# Patient Record
Sex: FEMALE | Race: ASIAN | NOT HISPANIC OR LATINO | Employment: UNEMPLOYED | URBAN - METROPOLITAN AREA
[De-identification: names, ages, dates, MRNs, and addresses within clinical notes are randomized per-mention and may not be internally consistent; named-entity substitution may affect disease eponyms.]

---

## 2019-04-15 ENCOUNTER — MEDICAL CORRESPONDENCE (OUTPATIENT)
Dept: HEALTH INFORMATION MANAGEMENT | Facility: CLINIC | Age: 54
End: 2019-04-15

## 2019-04-15 ENCOUNTER — TRANSFERRED RECORDS (OUTPATIENT)
Dept: HEALTH INFORMATION MANAGEMENT | Facility: CLINIC | Age: 54
End: 2019-04-15

## 2019-04-19 ENCOUNTER — TELEPHONE (OUTPATIENT)
Dept: MAMMOGRAPHY | Facility: CLINIC | Age: 54
End: 2019-04-19
Payer: COMMERCIAL

## 2020-04-05 ENCOUNTER — OFFICE VISIT (OUTPATIENT)
Dept: URGENT CARE | Facility: URGENT CARE | Age: 55
End: 2020-04-05
Payer: COMMERCIAL

## 2020-04-05 VITALS
TEMPERATURE: 98.4 F | RESPIRATION RATE: 16 BRPM | OXYGEN SATURATION: 98 % | DIASTOLIC BLOOD PRESSURE: 89 MMHG | SYSTOLIC BLOOD PRESSURE: 151 MMHG | WEIGHT: 145.5 LBS | HEART RATE: 71 BPM

## 2020-04-05 DIAGNOSIS — R10.13 EPIGASTRIC PAIN: Primary | ICD-10-CM

## 2020-04-05 PROCEDURE — 99203 OFFICE O/P NEW LOW 30 MIN: CPT | Performed by: NURSE PRACTITIONER

## 2020-04-05 PROCEDURE — 93000 ELECTROCARDIOGRAM COMPLETE: CPT | Performed by: NURSE PRACTITIONER

## 2020-04-05 RX ORDER — IBUPROFEN 600 MG/1
TABLET, FILM COATED ORAL
COMMUNITY
Start: 2020-03-22 | End: 2020-09-07

## 2020-04-05 RX ORDER — CYCLOBENZAPRINE HCL 10 MG
TABLET ORAL
COMMUNITY
Start: 2020-03-07 | End: 2020-09-07

## 2020-04-05 RX ORDER — SERTRALINE HYDROCHLORIDE 100 MG/1
100 TABLET, FILM COATED ORAL DAILY
COMMUNITY
Start: 2020-03-07

## 2020-04-05 ASSESSMENT — ENCOUNTER SYMPTOMS
SHORTNESS OF BREATH: 0
HEADACHES: 0
RHINORRHEA: 0
SORE THROAT: 0
COUGH: 0
FEVER: 0
DIARRHEA: 0
VOMITING: 0
ABDOMINAL PAIN: 1
CHILLS: 0
NAUSEA: 0

## 2020-04-05 NOTE — PATIENT INSTRUCTIONS
Patient Education     Epigastric Pain (Uncertain Cause)  Epigastric pain is pain in the upper abdomen. It can be a sign of disease. Common causes include:    Acid reflux (stomach acid flowing up into the esophagus)    Gastritis (irritation of the stomach lining) Most often this is from aspirin or NSAID medicines such as ibuprofen, bacteria called H. pylori, or frequent alcohol use.    Peptic ulcer disease    Inflammation of the pancreas    Gallstone    Infection in the gallbladder  Pain may be dull or burning. It may spread upward to the chest or to the back. There may be other symptoms such as belching, bloating, cramps or hunger pains. There may be weight loss or poor appetite, nausea or vomiting.  Since the cause of your pain is not certain yet,you may need more tests. Sometimes the doctor will treat you for the most likely condition to see if there is improvement before doing more tests.  Home care  Medicines    Antacids help neutralize the normal acids in your stomach.If you don t like the liquid, you can try a chewable one. You may find one works better than another for you. Overuse can cause diarrhea or constipation.    Acid blockers (H2 blockers) decrease acid production. Examples are cimetidine, famotidine, and ranitidine.    Acid inhibitors (PPIs) decrease acid production in a different way than the blockers. You may find they work better, but can take a little longer to take effect.  Examples are omeprazole, lansoprazole, pantoprazole, rabeprazole, and esomeprazole. Many of these are available over-the-counter or available as generics.    Take an antacid 30 to 60 minutes after eating and at bedtime, but not at the same time as an acid blocker.    Try not to take NSAIDs such as ibuprofen. Aspirin may also cause problems, but if taking it for your heart or other medical reasons, talk to your doctor before stopping it; you don't want to cause a worse problem, like a heart attack or stroke.  Diet    If  certain foods seem to cause your pain, try not to eat them. Certain foods can worsen symptoms of gastritis. Limit or avoid fatty, fried, and spicy foods, as well as coffee, chocolate, mint, and foods with high acid content such as tomatoes and citrus fruit and juices (orange, grapefruit, lemon).    Eat slowly and chew food well before swallowing. Symptoms of gastritis can be worsened by certain foods.    Don't drink alcohol. It can irritate the stomach.    Don't consume caffeine, or use tobacco. These can delay healing and worsen your problem.    Try eating smaller meals with snacks in between.    Keep an empty stomach for 2 to 3 hours before lying down.    Prop the head of the bed up if you have overnight symptoms. This helps acid clear from your esophagus.    Follow-up care  Follow up with your healthcare provider or as advised.  When to seek medical advice  Call your healthcare provider right away if any of the following occur:    Stomach pain worsens or moves to the right lower part of the abdomen    Chest pain appears, or if it worsens or spreads to the chest, back, neck, shoulder, or arm    Frequent vomiting (can t keep down liquids)    Blood in the stool or vomit (red or black color)    Feeling weak or dizzy, fainting, or having trouble breathing    Fever of 100.4 F (38 C) or higher, or as directed by your healthcare provider    Abdominal swelling  Date Last Reviewed: 3/1/2018    9744-3824 [employer]. [address] [place] [address], [place] [address]. All rights reserved. This information is not intended as a substitute for professional medical care. Always follow your healthcare professional's instructions.

## 2020-04-05 NOTE — PROGRESS NOTES
SUBJECTIVE:   Tracie Sosa is a 54 year old female presenting with a chief complaint of   Chief Complaint   Patient presents with     Abdominal Pain     1 weks ago felt like something was stuck in epigastreal area now it is worse       She is an established patient of [facility].    Upper abdomin Pain    Location: epigastric   Radiation: None.    Pain character: aching and burning,   Severity: 7 on a scale of 1-10.    Duration: 1 week(s)   Course of Illness: slowly progressive.  Exacerbated by: nothing  Relieved by: nothing.  Associated Symptoms: nausea.  Female : Not applicable  Surgical History: none  Daughter is requesting an EKG      Review of Systems   Constitutional: Negative for chills and fever.   HENT: Negative for congestion, ear pain, rhinorrhea and sore throat.    Respiratory: Negative for cough and shortness of breath.    Gastrointestinal: Positive for abdominal pain. Negative for diarrhea, nausea and vomiting.   Neurological: Negative for headaches.   All other systems reviewed and are negative.      No past medical history on file.  Family History   Problem Relation Age of Onset     Diabetes No family hx of      Cancer No family hx of      Hypertension No family hx of      Cerebrovascular Disease No family hx of      Glaucoma No family hx of      Thyroid Disease No family hx of      Macular Degeneration No family hx of      Current Outpatient Medications   Medication Sig Dispense Refill     cyclobenzaprine (FLEXERIL) 10 MG tablet TAKE 1 TABLET BY MOUTH EVERY DAY AS NEEDED       ibuprofen (ADVIL/MOTRIN) 600 MG tablet TAKE 1 TAB BY MOUTH EVERY 4 TO 6 (FOUR TO SIX) HOURS AS NEEDED FOR PAIN       ranitidine (ZANTAC) 300 MG tablet TAKE 1 TABLET BY MOUTH EVERY DAY AS NEEDED       sertraline (ZOLOFT) 100 MG tablet Take 100 mg by mouth daily       Social History     Tobacco Use     Smoking status: Never Smoker     Smokeless tobacco: Never Used   Substance Use Topics     Alcohol use: Not on file        OBJECTIVE  BP (!) 151/89 (BP Location: Left arm, Cuff Size: Adult Regular)   Pulse 71   Temp 98.4  F (36.9  C) (Oral)   Resp 16   Wt 66 kg (145 lb 8 oz)   SpO2 98%     Physical Exam  Vitals signs and nursing note reviewed.   Constitutional:       Appearance: She is well-developed. She is not diaphoretic.   HENT:      Head: Normocephalic and atraumatic.      Right Ear: Tympanic membrane and external ear normal.      Left Ear: Tympanic membrane and external ear normal.   Eyes:      Pupils: Pupils are equal, round, and reactive to light.   Neck:      Musculoskeletal: Normal range of motion and neck supple.   Pulmonary:      Effort: Pulmonary effort is normal. No respiratory distress.      Breath sounds: Normal breath sounds.   Abdominal:      General: Abdomen is flat. Bowel sounds are normal. There is no distension.      Palpations: Abdomen is soft.      Tenderness: There is no abdominal tenderness.   Lymphadenopathy:      Cervical: No cervical adenopathy.   Skin:     General: Skin is warm and dry.   Neurological:      Mental Status: She is alert.      Cranial Nerves: No cranial nerve deficit.     ASSESSMENT:      ICD-10-CM    1. Epigastric pain  R10.13 EKG 12-lead complete w/read - Clinics        Medical Decision Making:    Differential Diagnosis:  Abdominal Pain: Constipation, GB/Cholelithiasis, GERD/Ulcer and Pancreatitis      PLAN:  EKG is normal   Limit or avoid fatty, fried, and spicy foods, as well as coffee, chocolate, mint, and foods with high acid content such as tomatoes and citrus fruit and juices (orange, grapefruit, lemon).   Eat slowly and chew food well before swallowing. Symptoms of gastritis can be worsened by certain foods.  Don't drink alcohol. It can irritate the stomach. Don't consume caffeine, or use tobacco. These can delay healing and worsen your problem.  Try eating smaller meals with snacks in between.  Keep an empty stomach for 2 to 3 hours before lying down.  Prop the head of the  bed up if you have overnight symptoms. This helps acid clear from your esophagus.      If not improving or if condition worsens, follow up with your Primary Care Provider    There are no Patient Instructions on file for this visit.

## 2020-09-07 ENCOUNTER — OFFICE VISIT (OUTPATIENT)
Dept: URGENT CARE | Facility: URGENT CARE | Age: 55
End: 2020-09-07
Payer: COMMERCIAL

## 2020-09-07 VITALS
RESPIRATION RATE: 14 BRPM | SYSTOLIC BLOOD PRESSURE: 135 MMHG | HEART RATE: 60 BPM | DIASTOLIC BLOOD PRESSURE: 82 MMHG | WEIGHT: 149.2 LBS | TEMPERATURE: 97.8 F | OXYGEN SATURATION: 98 %

## 2020-09-07 DIAGNOSIS — M79.18 MUSCULOSKELETAL PAIN: ICD-10-CM

## 2020-09-07 DIAGNOSIS — M62.838 MUSCLE SPASM: ICD-10-CM

## 2020-09-07 DIAGNOSIS — G44.209 TENSION HEADACHE: Primary | ICD-10-CM

## 2020-09-07 PROCEDURE — 99213 OFFICE O/P EST LOW 20 MIN: CPT | Performed by: NURSE PRACTITIONER

## 2020-09-07 RX ORDER — CYCLOBENZAPRINE HCL 10 MG
5 TABLET ORAL 3 TIMES DAILY PRN
Qty: 21 TABLET | Refills: 0 | Status: SHIPPED | OUTPATIENT
Start: 2020-09-07 | End: 2020-09-14

## 2020-09-07 RX ORDER — ACETAMINOPHEN 500 MG
1000 TABLET ORAL EVERY 8 HOURS PRN
Qty: 100 TABLET | Refills: 0 | Status: SHIPPED | OUTPATIENT
Start: 2020-09-07 | End: 2020-09-17

## 2020-09-07 RX ORDER — IBUPROFEN 600 MG/1
600 TABLET, FILM COATED ORAL EVERY 6 HOURS PRN
Qty: 28 TABLET | Refills: 0 | Status: SHIPPED | OUTPATIENT
Start: 2020-09-07 | End: 2020-09-14

## 2020-09-07 NOTE — PATIENT INSTRUCTIONS
Patient Education     Managing Tension-type Headache Symptoms  A tension-type headache can develop slowly. Being aware of the symptoms helps you recognize a headache early. Then you can act to reduce pain and relieve tension. Methods for relieving your symptoms include self-care and medicine.    Tension-type symptoms  The earlier you recognize the symptoms of a tension-type headache, the easier it is to treat. Tension-type headaches may:    Start with fatigue, tension, or pain in the neck and shoulders    Feel like a band around the head    Be concentrated in the temple, the back of the head, behind the eyes, or in the face    Come and go, or last for days, weeks, or even longer    Involve referred pain -- this means that the area that hurts may not be where the problem starts  Self-care during a tension-type headache  When you have a tension-type headache, there are things you can do to relax, loosen muscles, and reduce the pain:    Brush your scalp lightly with a soft hairbrush.    Give yourself a massage. Knead the muscles running from your shoulders up the back of your skull. Or ask a friend to gently massage your neck and shoulders.    Use an ice pack. Wrap a thin cloth around a cold pack, a cold can of soda, or a bag of frozen vegetables. Apply this directly to the place where you feel pain (such as your neck or temples).    Use moist heat to relax your muscles. Take a warm shower or bath. Or drape a warm, moist towel around your neck and shoulders.  Relieving pain and tension  Over-the-counter or prescription medicine can help relieve pain. Another way to reduce your pain is to use relaxation techniques to loosen tight muscles.  Medicine  Medicine used for tension-type headaches include the following:    NSAIDs (nonsteroidal anti-inflammatories), such as aspirin and ibuprofen, relieve inflammation and help block pain signals.    Acetaminophen treats pain, and some formulations contain caffeine.     Muscle  relaxants can reduce painful muscle contractions.  Taking medicine safely  Be aware that:    Taking analgesics (pain relievers) or drinking too much coffee may lead to rebound headaches (frequent or severe headaches that can happen if you miss a dose of medication), so take pain medicines only as needed. If you think you have these headaches, contact your healthcare provider.    Taking too much medicine can cause sleep problems or stomach upset. Some over-the-counter headache medications may contain caffeine. These may disrupt sleep and worsen pain.  Relaxation techniques  A , class, book, or tape may help you learn these techniques. One or more of these methods may work for you:    Deep breathing. Slow, calm, deep breathing can help you relax. Breathe in for a count of 5 or more. Then slowly let the breath out.    Visualization. Imagining a peaceful, secure scene can give you a sense of control over your body and surroundings.    Progressive relaxation. This is done by tightening and then releasing muscle groups. Start at the top of your head and work your way down your body. Tighten each muscle group for 5 to 10 seconds. Then release the muscle group for the same amount of time.    Biofeedback. This is a type of training in which you learn to control certain physical functions and responses. This helps you learn to reduce muscle tension.  Date Last Reviewed: 4/1/2018 2000-2019 [employer]. [address] [place] [address], [place] [address]. All rights reserved. This information is not intended as a substitute for professional medical care. Always follow your healthcare professional's instructions.           Patient Education     Muscle Spasm  A muscle spasm is a sudden tightening of the muscle you can t control. This may be caused by strain, overworking the muscle, or injury. It can also be caused by dehydration, electrolyte imbalance, diabetes, alcohol use, and certain medicines. If it goes on long  enough the muscle spasm causes pain. Common areas for muscle spasm are the legs, neck, and back.  Home care    Heat, massage, and stretching will help relax muscle spasm.    When the spasm is in your arm or leg, stretch the muscle passively. To do this, have someone bend or straighten the joint above or below the muscle until you feel the stretch on the sore muscle. You can stretch the muscle actively by moving the affected body part. This will stretch the muscle that is in spasm. For example, if the spasm is in your calf, bend the ankle so your toes point upward toward your knee. This will stretch your calf muscle.    You may use over-the-counter pain medicine to control pain, unless another medicine was prescribed. If you have chronic liver or kidney disease or ever had a stomach ulcer or gastrointestinal bleeding, talk with your healthcare provider before using these medicines.    Follow-up care  Follow up with your healthcare provider, or as advised.    When to seek medical advice  Call your healthcare provider right away if any of the following occur:    Fingers or toes become swollen, cold, blue, numb, or tingly    You develop weakness in the affected arm or leg    Pain increases and is not controlled by the above measures  Date Last Reviewed: 5/1/2018 2000-2019 [employer]. [address] [place] [address], [place] [address]. All rights reserved. This information is not intended as a substitute for professional medical care. Always follow your healthcare professional's instructions.

## 2020-09-07 NOTE — PROGRESS NOTES
SUBJECTIVE:   Tracie Sosa is a 54 year old female presenting with a chief complaint of chills, headache, pain in the back of the neck and shoulders which feels like a burning sensation. Some occasional nausea.    Onset of symptoms was 5 day(s) ago.  Course of illness is same.    Severity moderate  Previous Treatment : Ibuprofen 600mg last night with some relief. She has not taken her prn Flexeril during this illness.      History reviewed. No pertinent past medical history.  Current Outpatient Medications   Medication Sig Dispense Refill     acetaminophen (TYLENOL) 500 MG tablet Take 2 tablets (1,000 mg) by mouth every 8 hours as needed for mild pain 100 tablet 0     cyclobenzaprine (FLEXERIL) 10 MG tablet Take 0.5 tablets (5 mg) by mouth 3 times daily as needed for muscle spasms 21 tablet 0     ibuprofen (ADVIL/MOTRIN) 600 MG tablet Take 1 tablet (600 mg) by mouth every 6 hours as needed for moderate pain 28 tablet 0     ranitidine (ZANTAC) 300 MG tablet TAKE 1 TABLET BY MOUTH EVERY DAY AS NEEDED       sertraline (ZOLOFT) 100 MG tablet Take 100 mg by mouth daily       Social History     Tobacco Use     Smoking status: Never Smoker     Smokeless tobacco: Never Used   Substance Use Topics     Alcohol use: Not on file     Family History   Problem Relation Age of Onset     Diabetes No family hx of      Cancer No family hx of      Hypertension No family hx of      Cerebrovascular Disease No family hx of      Glaucoma No family hx of      Thyroid Disease No family hx of      Macular Degeneration No family hx of         ROS: 10 point ROS neg other than the symptoms noted above in the HPI.     OBJECTIVE  /82   Pulse 60   Temp 97.8  F (36.6  C) (Tympanic)   Resp 14   Wt 67.7 kg (149 lb 3.2 oz)   SpO2 98%       GENERAL APPEARANCE: healthy appearing, alert     EYES: PERRL, EOMI, sclera non-icteric     HENT: oral exam benign, mucus membranes intact, without ulcers or lesions     NECK: no adenopathy or asymmetry,  thyroid normal to palpation     RESP: lungs clear to auscultation - no rales, rhonchi or wheezes     CV: regular rates and rhythm, no murmurs, rubs, or gallop     ABDOMEN:  soft, nontender, no HSM or masses and bowel sounds normal     MS: extremities normal- no gross deformities noted and tenderness over her trapezius and into the neck and occipital area     SKIN: no suspicious lesions or rashes     NEURO: Normal strength and tone, mentation intact and speech normal. Cranial nerves 2-12 intact.   PSYCH: normal thought process; no significant mood disturbance      Labs:  No results found for this or any previous visit (from the past 24 hour(s)).      ASSESSMENT/PLAN:      ICD-10-CM    1. Tension headache  G44.209 ibuprofen (ADVIL/MOTRIN) 600 MG tablet     acetaminophen (TYLENOL) 500 MG tablet   2. Musculoskeletal pain  M79.18 ibuprofen (ADVIL/MOTRIN) 600 MG tablet     cyclobenzaprine (FLEXERIL) 10 MG tablet     acetaminophen (TYLENOL) 500 MG tablet   3. Muscle spasm  M62.838 cyclobenzaprine (FLEXERIL) 10 MG tablet        Follow Up:      Patient Instructions     Patient Education     Managing Tension-type Headache Symptoms  A tension-type headache can develop slowly. Being aware of the symptoms helps you recognize a headache early. Then you can act to reduce pain and relieve tension. Methods for relieving your symptoms include self-care and medicine.    Tension-type symptoms  The earlier you recognize the symptoms of a tension-type headache, the easier it is to treat. Tension-type headaches may:    Start with fatigue, tension, or pain in the neck and shoulders    Feel like a band around the head    Be concentrated in the temple, the back of the head, behind the eyes, or in the face    Come and go, or last for days, weeks, or even longer    Involve referred pain -- this means that the area that hurts may not be where the problem starts  Self-care during a tension-type headache  When you have a tension-type headache,  there are things you can do to relax, loosen muscles, and reduce the pain:    Brush your scalp lightly with a soft hairbrush.    Give yourself a massage. Knead the muscles running from your shoulders up the back of your skull. Or ask a friend to gently massage your neck and shoulders.    Use an ice pack. Wrap a thin cloth around a cold pack, a cold can of soda, or a bag of frozen vegetables. Apply this directly to the place where you feel pain (such as your neck or temples).    Use moist heat to relax your muscles. Take a warm shower or bath. Or drape a warm, moist towel around your neck and shoulders.  Relieving pain and tension  Over-the-counter or prescription medicine can help relieve pain. Another way to reduce your pain is to use relaxation techniques to loosen tight muscles.  Medicine  Medicine used for tension-type headaches include the following:    NSAIDs (nonsteroidal anti-inflammatories), such as aspirin and ibuprofen, relieve inflammation and help block pain signals.    Acetaminophen treats pain, and some formulations contain caffeine.     Muscle relaxants can reduce painful muscle contractions.  Taking medicine safely  Be aware that:    Taking analgesics (pain relievers) or drinking too much coffee may lead to rebound headaches (frequent or severe headaches that can happen if you miss a dose of medication), so take pain medicines only as needed. If you think you have these headaches, contact your healthcare provider.    Taking too much medicine can cause sleep problems or stomach upset. Some over-the-counter headache medications may contain caffeine. These may disrupt sleep and worsen pain.  Relaxation techniques  A , class, book, or tape may help you learn these techniques. One or more of these methods may work for you:    Deep breathing. Slow, calm, deep breathing can help you relax. Breathe in for a count of 5 or more. Then slowly let the breath out.    Visualization. Imagining a peaceful,  secure scene can give you a sense of control over your body and surroundings.    Progressive relaxation. This is done by tightening and then releasing muscle groups. Start at the top of your head and work your way down your body. Tighten each muscle group for 5 to 10 seconds. Then release the muscle group for the same amount of time.    Biofeedback. This is a type of training in which you learn to control certain physical functions and responses. This helps you learn to reduce muscle tension.  Date Last Reviewed: 4/1/2018 2000-2019 [employer]. [address] [place] [id number]. All rights reserved. This information is not intended as a substitute for professional medical care. Always follow your healthcare professional's instructions.           Patient Education     Muscle Spasm  A muscle spasm is a sudden tightening of the muscle you can t control. This may be caused by strain, overworking the muscle, or injury. It can also be caused by dehydration, electrolyte imbalance, diabetes, alcohol use, and certain medicines. If it goes on long enough the muscle spasm causes pain. Common areas for muscle spasm are the legs, neck, and back.  Home care    Heat, massage, and stretching will help relax muscle spasm.    When the spasm is in your arm or leg, stretch the muscle passively. To do this, have someone bend or straighten the joint above or below the muscle until you feel the stretch on the sore muscle. You can stretch the muscle actively by moving the affected body part. This will stretch the muscle that is in spasm. For example, if the spasm is in your calf, bend the ankle so your toes point upward toward your knee. This will stretch your calf muscle.    You may use over-the-counter pain medicine to control pain, unless another medicine was prescribed. If you have chronic liver or kidney disease or ever had a stomach ulcer or gastrointestinal bleeding, talk with your healthcare provider  before using these medicines.    Follow-up care  Follow up with your healthcare provider, or as advised.    When to seek medical advice  Call your healthcare provider right away if any of the following occur:    Fingers or toes become swollen, cold, blue, numb, or tingly    You develop weakness in the affected arm or leg    Pain increases and is not controlled by the above measures  Date Last Reviewed: 5/1/2018 2000-2019 [employer]. [address] [place] [address], [place] [id number]. All rights reserved. This information is not intended as a substitute for professional medical care. Always follow your healthcare professional's instructions.               PRERNA Mckeon, CNP  [facility] Provider

## 2021-10-11 ENCOUNTER — LAB REQUISITION (OUTPATIENT)
Dept: LAB | Facility: CLINIC | Age: 56
End: 2021-10-11
Payer: COMMERCIAL

## 2021-10-11 DIAGNOSIS — E55.9 VITAMIN D DEFICIENCY, UNSPECIFIED: ICD-10-CM

## 2021-10-11 PROCEDURE — 82306 VITAMIN D 25 HYDROXY: CPT | Mod: ORL | Performed by: FAMILY MEDICINE

## 2021-10-12 LAB — DEPRECATED CALCIDIOL+CALCIFEROL SERPL-MC: 21 UG/L (ref 20–75)

## 2022-05-23 ENCOUNTER — LAB REQUISITION (OUTPATIENT)
Dept: LAB | Facility: CLINIC | Age: 57
End: 2022-05-23
Payer: COMMERCIAL

## 2022-05-23 DIAGNOSIS — Z12.11 ENCOUNTER FOR SCREENING FOR MALIGNANT NEOPLASM OF COLON: ICD-10-CM

## 2022-05-23 LAB — HEMOCCULT STL QL IA: NEGATIVE

## 2022-05-23 PROCEDURE — 82274 ASSAY TEST FOR BLOOD FECAL: CPT | Mod: ORL | Performed by: FAMILY MEDICINE

## 2022-07-20 ENCOUNTER — LAB REQUISITION (OUTPATIENT)
Dept: LAB | Facility: CLINIC | Age: 57
End: 2022-07-20
Payer: COMMERCIAL

## 2022-07-20 DIAGNOSIS — R73.03 PREDIABETES: ICD-10-CM

## 2022-07-20 DIAGNOSIS — E66.3 OVERWEIGHT: ICD-10-CM

## 2022-07-20 DIAGNOSIS — E87.6 HYPOKALEMIA: ICD-10-CM

## 2022-07-20 LAB
ALBUMIN SERPL BCG-MCNC: 4.5 G/DL (ref 3.5–5.2)
ALP SERPL-CCNC: 103 U/L (ref 35–104)
ALT SERPL W P-5'-P-CCNC: 43 U/L (ref 10–35)
ANION GAP SERPL CALCULATED.3IONS-SCNC: 11 MMOL/L (ref 7–15)
AST SERPL W P-5'-P-CCNC: 30 U/L (ref 10–35)
BILIRUB SERPL-MCNC: 0.6 MG/DL
BUN SERPL-MCNC: 12.4 MG/DL (ref 6–20)
CALCIUM SERPL-MCNC: 9.4 MG/DL (ref 8.6–10)
CHLORIDE SERPL-SCNC: 105 MMOL/L (ref 98–107)
CHOLEST SERPL-MCNC: 199 MG/DL
CREAT SERPL-MCNC: 0.81 MG/DL (ref 0.51–0.95)
DEPRECATED HCO3 PLAS-SCNC: 26 MMOL/L (ref 22–29)
GFR SERPL CREATININE-BSD FRML MDRD: 84 ML/MIN/1.73M2
GLUCOSE SERPL-MCNC: 90 MG/DL (ref 70–99)
HDLC SERPL-MCNC: 52 MG/DL
LDLC SERPL CALC-MCNC: 127 MG/DL
NONHDLC SERPL-MCNC: 147 MG/DL
POTASSIUM SERPL-SCNC: 4.2 MMOL/L (ref 3.4–5.3)
PROT SERPL-MCNC: 6.8 G/DL (ref 6.4–8.3)
SODIUM SERPL-SCNC: 142 MMOL/L (ref 136–145)
TRIGL SERPL-MCNC: 101 MG/DL

## 2022-07-20 PROCEDURE — 80061 LIPID PANEL: CPT | Performed by: FAMILY MEDICINE

## 2022-07-20 PROCEDURE — 80053 COMPREHEN METABOLIC PANEL: CPT | Performed by: FAMILY MEDICINE

## 2022-12-05 ENCOUNTER — LAB REQUISITION (OUTPATIENT)
Dept: LAB | Facility: CLINIC | Age: 57
End: 2022-12-05
Payer: COMMERCIAL

## 2022-12-05 DIAGNOSIS — E66.3 OVERWEIGHT: ICD-10-CM

## 2022-12-05 DIAGNOSIS — R73.03 PREDIABETES: ICD-10-CM

## 2022-12-05 DIAGNOSIS — Z00.00 ENCOUNTER FOR GENERAL ADULT MEDICAL EXAMINATION WITHOUT ABNORMAL FINDINGS: ICD-10-CM

## 2022-12-05 LAB
ALBUMIN SERPL BCG-MCNC: 4.5 G/DL (ref 3.5–5.2)
ALP SERPL-CCNC: 104 U/L (ref 35–104)
ALT SERPL W P-5'-P-CCNC: 62 U/L (ref 10–35)
ANION GAP SERPL CALCULATED.3IONS-SCNC: 13 MMOL/L (ref 7–15)
AST SERPL W P-5'-P-CCNC: 39 U/L (ref 10–35)
BILIRUB SERPL-MCNC: 0.5 MG/DL
BUN SERPL-MCNC: 17.8 MG/DL (ref 6–20)
CALCIUM SERPL-MCNC: 9.3 MG/DL (ref 8.6–10)
CHLORIDE SERPL-SCNC: 106 MMOL/L (ref 98–107)
CREAT SERPL-MCNC: 0.71 MG/DL (ref 0.51–0.95)
DEPRECATED HCO3 PLAS-SCNC: 22 MMOL/L (ref 22–29)
GFR SERPL CREATININE-BSD FRML MDRD: >90 ML/MIN/1.73M2
GLUCOSE SERPL-MCNC: 88 MG/DL (ref 70–99)
POTASSIUM SERPL-SCNC: 3.9 MMOL/L (ref 3.4–5.3)
PROT SERPL-MCNC: 7.1 G/DL (ref 6.4–8.3)
SODIUM SERPL-SCNC: 141 MMOL/L (ref 136–145)
TSH SERPL DL<=0.005 MIU/L-ACNC: 1.75 UIU/ML (ref 0.3–4.2)

## 2022-12-05 PROCEDURE — 80053 COMPREHEN METABOLIC PANEL: CPT | Performed by: FAMILY MEDICINE

## 2022-12-05 PROCEDURE — 84443 ASSAY THYROID STIM HORMONE: CPT | Mod: ORL | Performed by: FAMILY MEDICINE

## 2023-02-07 ENCOUNTER — MEDICAL CORRESPONDENCE (OUTPATIENT)
Dept: HEALTH INFORMATION MANAGEMENT | Facility: CLINIC | Age: 58
End: 2023-02-07
Payer: COMMERCIAL

## 2023-02-08 ENCOUNTER — TRANSCRIBE ORDERS (OUTPATIENT)
Dept: OTHER | Age: 58
End: 2023-02-08

## 2023-02-08 ENCOUNTER — APPOINTMENT (OUTPATIENT)
Dept: INTERPRETER SERVICES | Facility: CLINIC | Age: 58
End: 2023-02-08
Payer: COMMERCIAL

## 2023-02-08 DIAGNOSIS — R73.03 PRE-DIABETES: ICD-10-CM

## 2023-02-08 DIAGNOSIS — K21.00 GASTROESOPHAGEAL REFLUX DISEASE WITH ESOPHAGITIS: ICD-10-CM

## 2023-02-08 DIAGNOSIS — Z00.00 ANNUAL PHYSICAL EXAM: Primary | ICD-10-CM

## 2023-02-08 DIAGNOSIS — E87.6 HYPOKALEMIA: ICD-10-CM

## 2023-02-08 DIAGNOSIS — N76.0 VAGINOSIS: ICD-10-CM

## 2023-02-08 DIAGNOSIS — R30.0 DYSURIA: ICD-10-CM

## 2023-02-15 ENCOUNTER — TELEPHONE (OUTPATIENT)
Dept: GASTROENTEROLOGY | Facility: CLINIC | Age: 58
End: 2023-02-15
Payer: COMMERCIAL

## 2023-02-15 ENCOUNTER — APPOINTMENT (OUTPATIENT)
Dept: INTERPRETER SERVICES | Facility: CLINIC | Age: 58
End: 2023-02-15
Payer: COMMERCIAL

## 2023-02-15 NOTE — TELEPHONE ENCOUNTER
Screening Questions  BLUE  KIND OF PREP RED  LOCATION [review exclusion criteria] GREEN  SEDATION TYPE        N Are you active on mychart?       KARELY DEVLIN Ordering/Referring Provider?        [employer] What type of coverage do you have?      N Have you had a positive covid test in the last 14 days?     25.3 1. BMI  [BMI 40+ - review exclusion criteria]    Y  2. Are you able to give consent for your medical care? [IF NO,RN REVIEW]          N  3. Are you taking any prescription pain medications on a routine schedule   (ex narcotics: oxycodone, roxicodone, oxycontin,  and percocet)? [RN Review]          3a. EXTENDED PREP What kind of prescription?     N 4. Do you have any chemical dependencies such as alcohol, street drugs, or methadone?        **If yes 3- 5 , please schedule with MAC sedation.**          IF YES TO ANY 6 - 10 - HOSPITAL SETTING ONLY.     N 6.   Do you need assistance transferring?     N 7.   Have you had a heart or lung transplant?    N 8.   Are you currently on dialysis?   N 9.   Do you use daily home oxygen?   N 10. Do you take nitroglycerin?   10a.  If yes, how often?     11. [FEMALES]   Are you currently pregnant?    11a.  If yes, how many weeks? [ Greater than 12 weeks, OR NEEDED]    N 12. Do you have Pulmonary Hypertension? *NEED PAC APPT AT [facility] w/ MAC*     N 13. [review exclusion criteria]  Do you have any implantable devices in your body (pacemaker, defib, LVAD)?    N 14. In the past 6 months, have you had any heart related issues including cardiomyopathy or heart attack?     14a.  If yes, did it require cardiac stenting if so when?     N 15. Have you had a stroke or Transient ischemic attack (TIA - aka  mini stroke ) within 6 months?      N 16. Do you have mod to severe Obstructive Sleep Apnea?  [Hospital only]    N 17. Do you have SEVERE AND UNCONTROLLED asthma? *NEED PAC APPT AT [facility] w/MAC*     N 18. Are you currently taking any blood thinners?     18a. If yes, inform patient to  "\"follow up w/ ordering provider for bridging instructions.\"    N 19. Do you take the medication Phentermine?    19a. If yes, \"Hold for 7 days before procedure.  Please consult your prescribing provider if you have questions about holding this medication.\"     N  20. Do you have chronic kidney disease?      N  21. Do you have a diagnosis of diabetes?     N  22. On a regular basis do you go 3-5 days between bowel movements?      23. Preferred LOCAL Pharmacy for Pre Prescription    [ LIST ONLY ONE PHARMACY]     [facility]ACY #[id number] - [place], [place] - [address] [place] [address] AT [facility]        - CLOSING REMINDERS -    Informed patient they will need an adult    Cannot take any type of public or medical transportation alone    Conscious Sedation- Needs  for 6 hours after the procedure       MAC/General-Needs  for 24 hours after procedure    Pre-Procedure Covid test to be completed [[facility] PCR Testing Required]    Confirmed Nurse will call to complete assessment       - SCHEDULING DETAILS -  NO Hospital Setting Required? If yes, what is the exclusion?:    VIKRAM  Surgeon    4/4  Date of Procedure  Lower Endoscopy [Colonoscopy]  Type of Procedure Scheduled  [facility]-If you answer yes to questions #8, #20, #21Which Colonoscopy Prep was Sent?     MAC Sedation Type     NO PAC / Pre-op Required                 "

## 2023-02-16 ENCOUNTER — APPOINTMENT (OUTPATIENT)
Dept: INTERPRETER SERVICES | Facility: CLINIC | Age: 58
End: 2023-02-16
Payer: COMMERCIAL

## 2023-03-21 ENCOUNTER — TELEPHONE (OUTPATIENT)
Dept: GASTROENTEROLOGY | Facility: CLINIC | Age: 58
End: 2023-03-21
Payer: COMMERCIAL

## 2023-03-21 DIAGNOSIS — Z12.11 SCREEN FOR COLON CANCER: Primary | ICD-10-CM

## 2023-03-21 RX ORDER — BISACODYL 5 MG/1
TABLET, DELAYED RELEASE ORAL
Qty: 4 TABLET | Refills: 0 | Status: SHIPPED | OUTPATIENT
Start: 2023-03-21

## 2023-03-21 NOTE — TELEPHONE ENCOUNTER
Patient scheduled for Colonoscopy  on 04.04.2023.     Needs [facility]     Discuss Covid policy.     Pre op exam needed? N/A    Arrival time: 1145. Procedure time 1230    Facility location: [facility]; [address], [place] [address]    Sedation type: MAC    Anticoagulations? No    Electronic implanted devices? No    Diabetic? No    Indication for procedure: screening    Bowel prep recommendation: Standard Golytely     Prep instructions sent via letter Bowel prep script sent to [facility]/PHARMACY #[id number] - [place], [place] - [address] AT [facility]    Pre visit planning completed.    Mery Montilla RN  Endoscopy Procedure Pre Assessment RN

## 2023-03-21 NOTE — TELEPHONE ENCOUNTER
Attempted to contact patient regarding upcoming Colonoscopy  procedure on 04.04.2023 for pre assessment questions via [employer]  id [phone]. No answer.     Unable to leave voicemail as mailbox is full.     Mery Montilla RN  Endoscopy Procedure Pre Assessment RN

## 2023-03-27 NOTE — TELEPHONE ENCOUNTER
Called the Pt via [employer]  (Second attempt).  The patient did  and was active on the call for a few minutes.  It did appear that the patient disconnected from the call.   attempted to call the patient back, call went straight to voicemail, unable to leave voicemail as mailbox is not currently set up.  We will need to attempt to call again at a later time.  Not active on [employer].     Bindu Nicole RN   Endoscopy Procedure Pre Assessment RN

## 2023-03-29 ENCOUNTER — APPOINTMENT (OUTPATIENT)
Dept: INTERPRETER SERVICES | Facility: CLINIC | Age: 58
End: 2023-03-29
Payer: COMMERCIAL

## 2023-03-29 NOTE — TELEPHONE ENCOUNTER
Pre assessment questions via ong  completed for upcoming Colonoscopy  procedure scheduled on 4/4/23    COVID policy reviewed.     Reviewed procedural arrival time 1145, procedure time 1230 and facility location [facility]; [address], [place] [address]    Designated  policy reviewed. Instructed to have someone stay 24 hours post procedure.     Anticoagulation/blood thinners? No    Electronic implanted devices? No    Diabetic? No    Reviewed procedure prep instructions.     Prep instructions sent via email. Email sent to [email or web address]    Patient verbalized understanding and had no questions or concerns at this time.    Bindu Nicole RN  Endoscopy Procedure Pre Assessment RN

## 2023-04-04 ENCOUNTER — ANESTHESIA (OUTPATIENT)
Dept: SURGERY | Facility: AMBULATORY SURGERY CENTER | Age: 58
End: 2023-04-04
Payer: COMMERCIAL

## 2023-04-04 ENCOUNTER — ANESTHESIA EVENT (OUTPATIENT)
Dept: SURGERY | Facility: AMBULATORY SURGERY CENTER | Age: 58
End: 2023-04-04
Payer: COMMERCIAL

## 2023-04-04 ENCOUNTER — HOSPITAL ENCOUNTER (OUTPATIENT)
Facility: AMBULATORY SURGERY CENTER | Age: 58
Discharge: HOME | End: 2023-04-04
Attending: SURGERY
Payer: COMMERCIAL

## 2023-04-04 VITALS — HEART RATE: 58 BPM

## 2023-04-04 VITALS
HEART RATE: 55 BPM | SYSTOLIC BLOOD PRESSURE: 111 MMHG | RESPIRATION RATE: 16 BRPM | TEMPERATURE: 98.1 F | OXYGEN SATURATION: 99 % | DIASTOLIC BLOOD PRESSURE: 71 MMHG

## 2023-04-04 LAB — COLONOSCOPY: NORMAL

## 2023-04-04 PROCEDURE — G8918 PT W/O PREOP ORDER IV AB PRO: HCPCS

## 2023-04-04 PROCEDURE — G8907 PT DOC NO EVENTS ON DISCHARG: HCPCS

## 2023-04-04 PROCEDURE — 45385 COLONOSCOPY W/LESION REMOVAL: CPT | Mod: PT

## 2023-04-04 PROCEDURE — 88305 TISSUE EXAM BY PATHOLOGIST: CPT | Performed by: PATHOLOGY

## 2023-04-04 PROCEDURE — 45385 COLONOSCOPY W/LESION REMOVAL: CPT | Mod: PT | Performed by: SURGERY

## 2023-04-04 RX ORDER — NALOXONE HYDROCHLORIDE 0.4 MG/ML
0.2 INJECTION, SOLUTION INTRAMUSCULAR; INTRAVENOUS; SUBCUTANEOUS
Status: DISCONTINUED | OUTPATIENT
Start: 2023-04-04 | End: 2023-04-05 | Stop reason: HOSPADM

## 2023-04-04 RX ORDER — PROPOFOL 10 MG/ML
INJECTION, EMULSION INTRAVENOUS PRN
Status: DISCONTINUED | OUTPATIENT
Start: 2023-04-04 | End: 2023-04-04

## 2023-04-04 RX ORDER — NALOXONE HYDROCHLORIDE 0.4 MG/ML
0.4 INJECTION, SOLUTION INTRAMUSCULAR; INTRAVENOUS; SUBCUTANEOUS
Status: DISCONTINUED | OUTPATIENT
Start: 2023-04-04 | End: 2023-04-05 | Stop reason: HOSPADM

## 2023-04-04 RX ORDER — PROPOFOL 10 MG/ML
INJECTION, EMULSION INTRAVENOUS CONTINUOUS PRN
Status: DISCONTINUED | OUTPATIENT
Start: 2023-04-04 | End: 2023-04-04

## 2023-04-04 RX ORDER — PROCHLORPERAZINE MALEATE 10 MG
10 TABLET ORAL EVERY 6 HOURS PRN
Status: DISCONTINUED | OUTPATIENT
Start: 2023-04-04 | End: 2023-04-05 | Stop reason: HOSPADM

## 2023-04-04 RX ORDER — ONDANSETRON 4 MG/1
4 TABLET, ORALLY DISINTEGRATING ORAL EVERY 6 HOURS PRN
Status: DISCONTINUED | OUTPATIENT
Start: 2023-04-04 | End: 2023-04-05 | Stop reason: HOSPADM

## 2023-04-04 RX ORDER — ONDANSETRON 2 MG/ML
4 INJECTION INTRAMUSCULAR; INTRAVENOUS
Status: DISCONTINUED | OUTPATIENT
Start: 2023-04-04 | End: 2023-04-05 | Stop reason: HOSPADM

## 2023-04-04 RX ORDER — FLUMAZENIL 0.1 MG/ML
0.2 INJECTION, SOLUTION INTRAVENOUS
Status: DISCONTINUED | OUTPATIENT
Start: 2023-04-04 | End: 2023-04-05 | Stop reason: HOSPADM

## 2023-04-04 RX ORDER — LIDOCAINE 40 MG/G
CREAM TOPICAL
Status: DISCONTINUED | OUTPATIENT
Start: 2023-04-04 | End: 2023-04-05 | Stop reason: HOSPADM

## 2023-04-04 RX ORDER — LIDOCAINE HYDROCHLORIDE 20 MG/ML
INJECTION, SOLUTION INFILTRATION; PERINEURAL PRN
Status: DISCONTINUED | OUTPATIENT
Start: 2023-04-04 | End: 2023-04-04

## 2023-04-04 RX ORDER — SODIUM CHLORIDE, SODIUM LACTATE, POTASSIUM CHLORIDE, CALCIUM CHLORIDE 600; 310; 30; 20 MG/100ML; MG/100ML; MG/100ML; MG/100ML
INJECTION, SOLUTION INTRAVENOUS CONTINUOUS
Status: DISCONTINUED | OUTPATIENT
Start: 2023-04-04 | End: 2023-04-05 | Stop reason: HOSPADM

## 2023-04-04 RX ORDER — ONDANSETRON 2 MG/ML
4 INJECTION INTRAMUSCULAR; INTRAVENOUS EVERY 6 HOURS PRN
Status: DISCONTINUED | OUTPATIENT
Start: 2023-04-04 | End: 2023-04-05 | Stop reason: HOSPADM

## 2023-04-04 RX ADMIN — PROPOFOL 125 MCG/KG/MIN: 10 INJECTION, EMULSION INTRAVENOUS at 13:11

## 2023-04-04 RX ADMIN — SODIUM CHLORIDE, SODIUM LACTATE, POTASSIUM CHLORIDE, CALCIUM CHLORIDE: 600; 310; 30; 20 INJECTION, SOLUTION INTRAVENOUS at 12:33

## 2023-04-04 RX ADMIN — LIDOCAINE HYDROCHLORIDE 60 MG: 20 INJECTION, SOLUTION INFILTRATION; PERINEURAL at 13:11

## 2023-04-04 RX ADMIN — PROPOFOL 80 MG: 10 INJECTION, EMULSION INTRAVENOUS at 13:11

## 2023-04-04 NOTE — H&P
Patient seen for Endoscopy    HPI:  Patient is a 57 year old female here for screening colonoscopy. Not taking blood thinning medications. No MI or CVA history. No issues with previous sedation. No recent acute illness.    Review Of Systems    Skin: negative  Ears/Nose/Throat: negative  Respiratory: No shortness of breath, dyspnea on exertion, cough, or hemoptysis  Cardiovascular: negative  Gastrointestinal: negative  Genitourinary: negative  Musculoskeletal: negative  Neurologic: negative  Hematologic/Lymphatic/Immunologic: negative  Endocrine: negative      History reviewed. No pertinent past medical history.    History reviewed. No pertinent surgical history.    Family History   Problem Relation Age of Onset     Diabetes No family hx of      Cancer No family hx of      Hypertension No family hx of      Cerebrovascular Disease No family hx of      Glaucoma No family hx of      Thyroid Disease No family hx of      Macular Degeneration No family hx of        Social History     Socioeconomic History     Marital status:      Spouse name: Not on file     Number of children: Not on file     Years of education: Not on file     Highest education level: Not on file   Occupational History     Not on file   Tobacco Use     Smoking status: Never     Smokeless tobacco: Never   Vaping Use     Vaping status: Not on file   Substance and Sexual Activity     Alcohol use: Not on file     Drug use: Not on file     Sexual activity: Not on file   Other Topics Concern     Not on file   Social History Narrative     Not on file     Social Determinants of Health     Financial Resource Strain: Not on file   Food Insecurity: Not on file   Transportation Needs: Not on file   Physical Activity: Not on file   Stress: Not on file   Social Connections: Not on file   Intimate Partner Violence: Not on file   Housing Stability: Not on file       Current Outpatient Medications   Medication Sig Dispense Refill     bisacodyl (DULCOLAX) 5 MG EC  tablet Take 2 tablets at 3 pm the day before your procedure. If your procedure is before 11 am, take 2 additional tablets at 11 pm. If your procedure is after 11 am, take 2 additional tablets at 6 am. For additional instructions refer to your colonoscopy prep instructions. 4 tablet 0     polyethylene glycol (GOLYTELY) 236 g suspension The night before the exam at 6 pm drink an 8-ounce glass every 15 minutes until the jug is half empty. If you arrive before 11 AM: Drink the other half of the Golytely jug at 11 PM night before procedure. If you arrive after 11 AM: Drink the other half of the Golytely jug at 6 AM day of procedure. For additional instructions refer to your colonoscopy prep instructions. 4000 mL 0     ranitidine (ZANTAC) 300 MG tablet TAKE 1 TABLET BY MOUTH EVERY DAY AS NEEDED       sertraline (ZOLOFT) 100 MG tablet Take 100 mg by mouth daily         Medications and history reviewed    Physical exam:  Vitals: /79   Pulse 63   Temp 98.1  F (36.7  C) (Temporal)   Resp 16   SpO2 98%   BMI= There is no height or weight on file to calculate BMI.    Constitutional: Healthy, alert, non-distressed   Head: Normo-cephalic, atraumatic, no lesions, masses or tenderness   Cardiovascular: RRR, no new murmurs, +S1, +S2 heart sounds, no clicks, rubs or gallops   Respiratory: CTAB, no rales, rhonchi or wheezing, equal chest rise, good respiratory effort   Gastrointestinal: Soft, non-tender, non distended, no rebound rigidity or guarding, no masses or hernias palpated   : Deferred  Musculoskeletal: Moves all extremities, normal  strength, no deformities noted   Skin: No suspicious lesions or rashes   Psychiatric: Mentation appears normal, affect appropriate   Hematologic/Lymphatic/Immunologic: Normal cervical and supraclavicular lymph nodes   Patient able to get up on table without difficulty.    Labs show:  No results found for this or any previous visit (from the past 24 hour(s)).    Assessment:  Endoscopy  Plan: Pt cleared for anesthesia for proposed procedure.    Quinn Pozo, DO

## 2023-04-04 NOTE — LETTER
Tracie Sosa  [address] [facility] [address]  April 6, 2023    Dear Tracie,  This letter is to inform you of the results of your pathology report from your colonoscopy.  Your pathology report was:  Final Diagnosis   A.  TRANSVERSE COLON POLYP, POLYPECTOMY:  -Tubular adenoma  -Negative for high-grade dysplasia   These are benign polyps. These types of polyps do carry a small risk of developing into a cancer over time if not removed. Yours were completely removed at the time of your colonoscopy. You should have another surveillance colonoscopy in 7 years.  If you have further questions please don t hesitate to call our clinic at [phone].   Sincerely,     Quinn Pozo, DO

## 2023-04-04 NOTE — ANESTHESIA POSTPROCEDURE EVALUATION
Patient: Tracie Sosa    Procedure: Procedure(s):  COLONOSCOPY, WITH CO2 INSUFFLATION  COLONOSCOPY, FLEXIBLE, WITH LESION REMOVAL USING SNARE       Anesthesia Type:  MAC    Note:  Disposition: Outpatient   Postop Pain Control: Uneventful            Sign Out: Well controlled pain   PONV: No   Neuro/Psych: Uneventful            Sign Out: Acceptable/Baseline neuro status   Airway/Respiratory: Uneventful            Sign Out: Acceptable/Baseline resp. status   CV/Hemodynamics: Uneventful            Sign Out: Acceptable CV status; No obvious hypovolemia; No obvious fluid overload   Other NRE: NONE   DID A NON-ROUTINE EVENT OCCUR? No           Last vitals:  Vitals Value Taken Time   /71 04/04/23 1346   Temp     Pulse 55 04/04/23 1346   Resp 16 04/04/23 1346   SpO2 99 % 04/04/23 1346       Electronically Signed By: Jayro Ware MD  April 4, 2023  4:08 PM

## 2023-04-04 NOTE — ANESTHESIA CARE TRANSFER NOTE
Patient: Tracie Sosa    Procedure: Procedure(s):  COLONOSCOPY, WITH CO2 INSUFFLATION  COLONOSCOPY, FLEXIBLE, WITH LESION REMOVAL USING SNARE       Diagnosis: Annual physical exam [Z00.00]  Diagnosis Additional Information: No value filed.    Anesthesia Type:   MAC     Note:    Oropharynx: oropharynx clear of all foreign objects  Level of Consciousness: drowsy  Oxygen Supplementation: room air    Independent Airway: airway patency satisfactory and stable  Dentition: dentition unchanged  Vital Signs Stable: post-procedure vital signs reviewed and stable  Report to RN Given: handoff report given  Patient transferred to: Phase II    Handoff Report: Identifed the Patient, Identified the Reponsible Provider, Reviewed the pertinent medical history, Discussed the surgical course, Reviewed Intra-OP anesthesia mangement and issues during anesthesia, Set expectations for post-procedure period and Allowed opportunity for questions and acknowledgement of understanding      Vitals:  Vitals Value Taken Time   BP     Temp     Pulse     Resp     SpO2         Electronically Signed By: PRERNA Fagan CRNA  April 4, 2023  1:26 PM

## 2023-04-04 NOTE — ANESTHESIA PREPROCEDURE EVALUATION
Anesthesia Pre-Procedure Evaluation    Patient: Tracie Sosa   MRN: [id number] : 1965        Procedure : Procedure(s):  COLONOSCOPY, WITH CO2 INSUFFLATION          History reviewed. No pertinent past medical history.   History reviewed. No pertinent surgical history.   No Known Allergies   Social History     Tobacco Use     Smoking status: Never     Smokeless tobacco: Never   Vaping Use     Vaping status: Not on file   Substance Use Topics     Alcohol use: Not on file      Wt Readings from Last 1 Encounters:   20 67.7 kg (149 lb 3.2 oz)        Anesthesia Evaluation   Pt has not had prior anesthetic         ROS/MED HX  ENT/Pulmonary:  - neg pulmonary ROS     Neurologic:  - neg neurologic ROS     Cardiovascular:  - neg cardiovascular ROS     METS/Exercise Tolerance:     Hematologic:  - neg hematologic  ROS     Musculoskeletal:  - neg musculoskeletal ROS     GI/Hepatic:  - neg GI/hepatic ROS     Renal/Genitourinary:  - neg Renal ROS     Endo:  - neg endo ROS     Psychiatric/Substance Use:  - neg psychiatric ROS     Infectious Disease:  - neg infectious disease ROS     Malignancy:  - neg malignancy ROS     Other:  - neg other ROS          Physical Exam    Airway  airway exam normal           Respiratory Devices and Support         Dental    unable to assess        Cardiovascular   cardiovascular exam normal          Pulmonary   pulmonary exam normal                OUTSIDE LABS:  CBC: No results found for: WBC, HGB, HCT, PLT  BMP:   Lab Results   Component Value Date     2022     2022    POTASSIUM 3.9 2022    POTASSIUM 4.2 2022    CHLORIDE 106 2022    CHLORIDE 105 2022    CO2 22 2022    CO2 26 2022    BUN 17.8 2022    BUN 12.4 2022    CR 0.71 2022    CR 0.81 2022    GLC 88 2022    GLC 90 2022     COAGS: No results found for: PTT, INR, FIBR  POC: No results found for: BGM, HCG, HCGS  HEPATIC:   Lab Results    Component Value Date    ALBUMIN 4.5 12/05/2022    PROTTOTAL 7.1 12/05/2022    ALT 62 (H) 12/05/2022    AST 39 (H) 12/05/2022    ALKPHOS 104 12/05/2022    BILITOTAL 0.5 12/05/2022     OTHER:   Lab Results   Component Value Date    PAT 9.3 12/05/2022    TSH 1.75 12/05/2022       Anesthesia Plan    ASA Status:  1   NPO Status:  NPO Appropriate    Anesthesia Type: MAC.     - Reason for MAC: chronic cardiopulmonary disease   Induction: Intravenous, Propofol.   Maintenance: TIVA.        Consents    Anesthesia Plan(s) and associated risks, benefits, and realistic alternatives discussed. Questions answered and patient/representative(s) expressed understanding.    - Discussed:     - Discussed with:  Patient      - Extended Intubation/Ventilatory Support Discussed: No.      - Patient is DNR/DNI Status: No    Use of blood products discussed: No .     Postoperative Care    Post procedure pain management: None required.   PONV prophylaxis: Ondansetron (or other 5HT-3), Background Propofol Infusion     Comments:           H&P reviewed: Unable to attach VIRTUAL H&P to encounter due to EHR limitations. Appropriate H&P reviewed. The physical exam performed by anesthesia during this surgical encounter serves as the physical portion of that virtual H&P.  Any significant changes noted within this preop evaluation.          Jayro Ware MD

## 2023-04-06 LAB
PATH REPORT.COMMENTS IMP SPEC: NORMAL
PATH REPORT.COMMENTS IMP SPEC: NORMAL
PATH REPORT.FINAL DX SPEC: NORMAL
PATH REPORT.GROSS SPEC: NORMAL
PATH REPORT.MICROSCOPIC SPEC OTHER STN: NORMAL
PATH REPORT.RELEVANT HX SPEC: NORMAL
PHOTO IMAGE: NORMAL

## 2023-06-26 ENCOUNTER — LAB REQUISITION (OUTPATIENT)
Dept: LAB | Facility: CLINIC | Age: 58
End: 2023-06-26
Payer: COMMERCIAL

## 2023-06-26 DIAGNOSIS — F33.1 MAJOR DEPRESSIVE DISORDER, RECURRENT, MODERATE (H): ICD-10-CM

## 2023-06-26 DIAGNOSIS — E87.6 HYPOKALEMIA: ICD-10-CM

## 2023-06-26 DIAGNOSIS — Z13.220 ENCOUNTER FOR SCREENING FOR LIPOID DISORDERS: ICD-10-CM

## 2023-06-26 DIAGNOSIS — R73.03 PREDIABETES: ICD-10-CM

## 2023-06-26 LAB
ALBUMIN SERPL BCG-MCNC: 4.2 G/DL (ref 3.5–5.2)
ALP SERPL-CCNC: 111 U/L (ref 35–104)
ALT SERPL W P-5'-P-CCNC: 37 U/L (ref 0–50)
ANION GAP SERPL CALCULATED.3IONS-SCNC: 12 MMOL/L (ref 7–15)
AST SERPL W P-5'-P-CCNC: 33 U/L (ref 0–45)
BILIRUB SERPL-MCNC: 0.8 MG/DL
BUN SERPL-MCNC: 13 MG/DL (ref 6–20)
CALCIUM SERPL-MCNC: 9.4 MG/DL (ref 8.6–10)
CHLORIDE SERPL-SCNC: 107 MMOL/L (ref 98–107)
CHOLEST SERPL-MCNC: 156 MG/DL
CREAT SERPL-MCNC: 0.7 MG/DL (ref 0.51–0.95)
DEPRECATED HCO3 PLAS-SCNC: 21 MMOL/L (ref 22–29)
GFR SERPL CREATININE-BSD FRML MDRD: >90 ML/MIN/1.73M2
GLUCOSE SERPL-MCNC: 100 MG/DL (ref 70–99)
HDLC SERPL-MCNC: 46 MG/DL
LDLC SERPL CALC-MCNC: 94 MG/DL
NONHDLC SERPL-MCNC: 110 MG/DL
POTASSIUM SERPL-SCNC: 3.4 MMOL/L (ref 3.4–5.3)
PROT SERPL-MCNC: 6.8 G/DL (ref 6.4–8.3)
SODIUM SERPL-SCNC: 140 MMOL/L (ref 136–145)
TRIGL SERPL-MCNC: 79 MG/DL

## 2023-06-26 PROCEDURE — 80053 COMPREHEN METABOLIC PANEL: CPT | Mod: ORL | Performed by: FAMILY MEDICINE

## 2023-06-26 PROCEDURE — 80061 LIPID PANEL: CPT | Mod: ORL | Performed by: FAMILY MEDICINE

## 2023-10-09 ENCOUNTER — LAB REQUISITION (OUTPATIENT)
Dept: LAB | Facility: CLINIC | Age: 58
End: 2023-10-09
Payer: COMMERCIAL

## 2023-10-09 DIAGNOSIS — R73.03 PREDIABETES: ICD-10-CM

## 2023-10-09 DIAGNOSIS — E87.6 HYPOKALEMIA: ICD-10-CM

## 2023-10-09 LAB
ALBUMIN SERPL BCG-MCNC: 4.5 G/DL (ref 3.5–5.2)
ALP SERPL-CCNC: 104 U/L (ref 35–104)
ALT SERPL W P-5'-P-CCNC: 33 U/L (ref 0–50)
ANION GAP SERPL CALCULATED.3IONS-SCNC: 12 MMOL/L (ref 7–15)
AST SERPL W P-5'-P-CCNC: 29 U/L (ref 0–45)
BILIRUB SERPL-MCNC: 0.8 MG/DL
BUN SERPL-MCNC: 12.6 MG/DL (ref 6–20)
CALCIUM SERPL-MCNC: 9.4 MG/DL (ref 8.6–10)
CHLORIDE SERPL-SCNC: 106 MMOL/L (ref 98–107)
CREAT SERPL-MCNC: 0.78 MG/DL (ref 0.51–0.95)
DEPRECATED HCO3 PLAS-SCNC: 23 MMOL/L (ref 22–29)
EGFRCR SERPLBLD CKD-EPI 2021: 88 ML/MIN/1.73M2
GLUCOSE SERPL-MCNC: 103 MG/DL (ref 70–99)
POTASSIUM SERPL-SCNC: 4 MMOL/L (ref 3.4–5.3)
PROT SERPL-MCNC: 7.2 G/DL (ref 6.4–8.3)
SODIUM SERPL-SCNC: 141 MMOL/L (ref 135–145)

## 2023-10-09 PROCEDURE — 80053 COMPREHEN METABOLIC PANEL: CPT | Mod: ORL | Performed by: FAMILY MEDICINE

## 2024-04-15 ENCOUNTER — LAB REQUISITION (OUTPATIENT)
Dept: LAB | Facility: CLINIC | Age: 59
End: 2024-04-15
Payer: COMMERCIAL

## 2024-04-15 DIAGNOSIS — Z11.3 ENCOUNTER FOR SCREENING FOR INFECTIONS WITH A PREDOMINANTLY SEXUAL MODE OF TRANSMISSION: ICD-10-CM

## 2024-04-15 DIAGNOSIS — E11.9 TYPE 2 DIABETES MELLITUS WITHOUT COMPLICATIONS (H): ICD-10-CM

## 2024-04-15 LAB
CREAT UR-MCNC: 36.8 MG/DL
MICROALBUMIN UR-MCNC: <12 MG/L
MICROALBUMIN/CREAT UR: NORMAL MG/G{CREAT}

## 2024-04-15 PROCEDURE — 87340 HEPATITIS B SURFACE AG IA: CPT | Mod: ORL | Performed by: FAMILY MEDICINE

## 2024-04-15 PROCEDURE — 86706 HEP B SURFACE ANTIBODY: CPT | Mod: ORL | Performed by: FAMILY MEDICINE

## 2024-04-15 PROCEDURE — 86803 HEPATITIS C AB TEST: CPT | Mod: ORL | Performed by: FAMILY MEDICINE

## 2024-04-15 PROCEDURE — 87389 HIV-1 AG W/HIV-1&-2 AB AG IA: CPT | Mod: ORL | Performed by: FAMILY MEDICINE

## 2024-04-15 PROCEDURE — 82570 ASSAY OF URINE CREATININE: CPT | Mod: ORL | Performed by: FAMILY MEDICINE

## 2024-04-16 LAB
HBV SURFACE AB SERPL IA-ACNC: 77.3 M[IU]/ML
HBV SURFACE AB SERPL IA-ACNC: REACTIVE M[IU]/ML
HBV SURFACE AG SERPL QL IA: NONREACTIVE
HCV AB SERPL QL IA: NONREACTIVE
HIV 1+2 AB+HIV1 P24 AG SERPL QL IA: NONREACTIVE

## 2024-04-17 ENCOUNTER — TRANSCRIBE ORDERS (OUTPATIENT)
Dept: OTHER | Age: 59
End: 2024-04-17

## 2024-04-17 DIAGNOSIS — E11.9 CONTROLLED TYPE 2 DIABETES MELLITUS WITHOUT COMPLICATION, WITHOUT LONG-TERM CURRENT USE OF INSULIN (H): Primary | ICD-10-CM

## 2024-09-09 ENCOUNTER — MEDICAL CORRESPONDENCE (OUTPATIENT)
Dept: HEALTH INFORMATION MANAGEMENT | Facility: CLINIC | Age: 59
End: 2024-09-09
Payer: COMMERCIAL

## 2024-09-10 ENCOUNTER — TRANSCRIBE ORDERS (OUTPATIENT)
Dept: OTHER | Age: 59
End: 2024-09-10

## 2024-09-10 DIAGNOSIS — E11.9 CONTROLLED TYPE 2 DIABETES MELLITUS WITHOUT COMPLICATION, WITHOUT LONG-TERM CURRENT USE OF INSULIN (H): Primary | ICD-10-CM

## 2024-10-02 ENCOUNTER — ANCILLARY PROCEDURE (OUTPATIENT)
Dept: MAMMOGRAPHY | Facility: CLINIC | Age: 59
End: 2024-10-02
Attending: FAMILY MEDICINE
Payer: COMMERCIAL

## 2024-10-02 DIAGNOSIS — Z12.31 VISIT FOR SCREENING MAMMOGRAM: ICD-10-CM

## 2024-10-02 PROCEDURE — 77067 SCR MAMMO BI INCL CAD: CPT | Mod: TC | Performed by: RADIOLOGY

## 2024-10-02 PROCEDURE — 77063 BREAST TOMOSYNTHESIS BI: CPT | Mod: TC | Performed by: RADIOLOGY

## 2024-10-16 ENCOUNTER — OFFICE VISIT (OUTPATIENT)
Dept: OPHTHALMOLOGY | Facility: CLINIC | Age: 59
End: 2024-10-16
Payer: COMMERCIAL

## 2024-10-16 DIAGNOSIS — H52.4 PRESBYOPIA: ICD-10-CM

## 2024-10-16 DIAGNOSIS — E11.9 CONTROLLED TYPE 2 DIABETES MELLITUS WITHOUT COMPLICATION, WITHOUT LONG-TERM CURRENT USE OF INSULIN (H): Chronic | ICD-10-CM

## 2024-10-16 DIAGNOSIS — H25.813 COMBINED FORMS OF AGE-RELATED CATARACT OF BOTH EYES: ICD-10-CM

## 2024-10-16 DIAGNOSIS — Z01.01 ENCOUNTER FOR EXAMINATION OF EYES AND VISION WITH ABNORMAL FINDINGS: Primary | ICD-10-CM

## 2024-10-16 DIAGNOSIS — H35.372 EPIRETINAL MEMBRANE (ERM) OF LEFT EYE: ICD-10-CM

## 2024-10-16 PROBLEM — K63.5 POLYP OF COLON: Status: ACTIVE | Noted: 2024-04-15

## 2024-10-16 PROBLEM — M54.89 OTHER DORSALGIA: Status: ACTIVE | Noted: 2017-12-06

## 2024-10-16 PROBLEM — K80.00 ACUTE CHOLECYSTITIS DUE TO BILIARY CALCULUS: Status: ACTIVE | Noted: 2022-12-22

## 2024-10-16 PROBLEM — E78.2 MIXED HYPERLIPIDEMIA: Status: ACTIVE | Noted: 2024-04-15

## 2024-10-16 PROBLEM — E87.6 HYPOKALEMIA: Status: ACTIVE | Noted: 2020-09-21

## 2024-10-16 PROBLEM — G89.29 CHRONIC MIDLINE LOW BACK PAIN: Status: ACTIVE | Noted: 2022-12-22

## 2024-10-16 PROBLEM — M54.50 CHRONIC MIDLINE LOW BACK PAIN: Status: ACTIVE | Noted: 2022-12-22

## 2024-10-16 PROBLEM — D13.5 ADENOMYOMATOSIS OF GALLBLADDER: Status: ACTIVE | Noted: 2022-12-22

## 2024-10-16 PROCEDURE — 92004 COMPRE OPH EXAM NEW PT 1/>: CPT | Performed by: OPHTHALMOLOGY

## 2024-10-16 PROCEDURE — 92015 DETERMINE REFRACTIVE STATE: CPT | Performed by: OPHTHALMOLOGY

## 2024-10-16 RX ORDER — CALCIUM CARBONATE/VITAMIN D3 600 MG-10
1 TABLET ORAL
COMMUNITY
Start: 2024-09-09

## 2024-10-16 RX ORDER — ACETAMINOPHEN 500 MG
TABLET ORAL
COMMUNITY
Start: 2024-09-09

## 2024-10-16 RX ORDER — CYCLOBENZAPRINE HCL 10 MG
1 TABLET ORAL DAILY PRN
COMMUNITY
Start: 2024-09-09

## 2024-10-16 RX ORDER — ATORVASTATIN CALCIUM 20 MG/1
1 TABLET, FILM COATED ORAL DAILY
COMMUNITY
Start: 2024-09-09

## 2024-10-16 ASSESSMENT — TONOMETRY
IOP_METHOD: APPLANATION
OD_IOP_MMHG: 18
OS_IOP_MMHG: 17

## 2024-10-16 ASSESSMENT — CUP TO DISC RATIO
OD_RATIO: 0.3
OS_RATIO: 0.3

## 2024-10-16 ASSESSMENT — SLIT LAMP EXAM - LIDS
COMMENTS: 2+ DERMATOCHALASIS
COMMENTS: 2+ DERMATOCHALASIS

## 2024-10-16 ASSESSMENT — VISUAL ACUITY
OD_SC: 20/150
METHOD: SNELLEN - LINEAR
OS_SC: 20/40
OD_SC+: +1

## 2024-10-16 ASSESSMENT — CONF VISUAL FIELD
OD_SUPERIOR_TEMPORAL_RESTRICTION: 0
OD_SUPERIOR_NASAL_RESTRICTION: 0
OD_INFERIOR_NASAL_RESTRICTION: 0
OS_SUPERIOR_TEMPORAL_RESTRICTION: 0
OD_INFERIOR_TEMPORAL_RESTRICTION: 0
OS_SUPERIOR_NASAL_RESTRICTION: 0
METHOD: COUNTING FINGERS
OS_INFERIOR_TEMPORAL_RESTRICTION: 0
OS_INFERIOR_NASAL_RESTRICTION: 0
OD_NORMAL: 1
OS_NORMAL: 1

## 2024-10-16 ASSESSMENT — EXTERNAL EXAM - RIGHT EYE: OD_EXAM: NORMAL

## 2024-10-16 ASSESSMENT — EXTERNAL EXAM - LEFT EYE: OS_EXAM: NORMAL

## 2024-10-16 ASSESSMENT — REFRACTION_MANIFEST
OS_AXIS: 010
OD_CYLINDER: +0.50
OD_AXIS: 150
OS_CYLINDER: +0.25
OS_SPHERE: -0.25
OD_SPHERE: -1.75

## 2024-10-16 NOTE — PROGRESS NOTES
" Current Eye Medications:  None     Subjective:  Diabetic eye exam.  Patient mentions a decrease in distance vision and in near vision since her last exam. She has no floaters but says there are blurry spots that come and go in both eyes. No flashing lights. Patient has no ocular pain or discomfort. No other ocular concerns today.   A1C was 6.1 on 9/9/24.  Patient is not sure what her last A1c was but it was better that it had been before. Her last blood sugar was in the 120's.      Objective:  See Ophthalmology Exam.       Assessment:  Baseline eye exam in patient with diabetes and mild-moderate cataracts.  No diabetic retinopathy.      ICD-10-CM    1. Encounter for examination of eyes and vision with abnormal findings  Z01.01       2. Presbyopia  H52.4       3. Controlled type 2 diabetes mellitus without complication, without long-term current use of insulin (H)  E11.9       4. Combined forms of age-related cataract, mild-mod, of both eyes  H25.813       5. Epiretinal membrane, mild, of left eye  H35.372            Plan:  Glasses prescription given - optional    May use artificial tears up to four times a day (like Refresh Optive, Systane Balance, or TheraTears. Avoid \"get the red out\" drops and generic artifical tears).     Call in June 2025 for an appointment in October 2025 for Complete Exam    Dr. Wells [phone]       "

## 2024-10-16 NOTE — PATIENT INSTRUCTIONS
"Glasses prescription given - optional    May use artificial tears up to four times a day (like Refresh Optive, Systane Balance, or TheraTears. Avoid \"get the red out\" drops and generic artifical tears).     Call in June 2025 for an appointment in October 2025 for Complete Exam    Dr. Wells [phone]    "

## 2024-10-16 NOTE — LETTER
"10/16/2024      Tracie Sosa  [address] [facility] [address]      Dear Colleague,    Thank you for referring your patient, Tracie Sosa, to the [facility]. Please see a copy of my visit note below.     Current Eye Medications:  None     Subjective:  Diabetic eye exam.  Patient mentions a decrease in distance vision and in near vision since her last exam. She has no floaters but says there are blurry spots that come and go in both eyes. No flashing lights. Patient has no ocular pain or discomfort. No other ocular concerns today.   A1C was 6.1 on 9/9/24.  Patient is not sure what her last A1c was but it was better that it had been before. Her last blood sugar was in the 120's.      Objective:  See Ophthalmology Exam.       Assessment:  Baseline eye exam in patient with diabetes and mild-moderate cataracts.  No diabetic retinopathy.      ICD-10-CM    1. Encounter for examination of eyes and vision with abnormal findings  Z01.01       2. Presbyopia  H52.4       3. Controlled type 2 diabetes mellitus without complication, without long-term current use of insulin (H)  E11.9       4. Combined forms of age-related cataract, mild-mod, of both eyes  H25.813       5. Epiretinal membrane, mild, of left eye  H35.372            Plan:  Glasses prescription given - optional    May use artificial tears up to four times a day (like Refresh Optive, Systane Balance, or TheraTears. Avoid \"get the red out\" drops and generic artifical tears).     Call in June 2025 for an appointment in October 2025 for Complete Exam    Dr. Wells [phone]         Again, thank you for allowing me to participate in the care of your patient.        Sincerely,        Brayan Wells MD  "

## 2024-10-17 PROBLEM — H35.372 EPIRETINAL MEMBRANE (ERM) OF LEFT EYE: Status: ACTIVE | Noted: 2024-10-17

## 2024-10-17 PROBLEM — H25.813 COMBINED FORMS OF AGE-RELATED CATARACT OF BOTH EYES: Status: ACTIVE | Noted: 2024-10-17

## 2024-12-09 ENCOUNTER — LAB REQUISITION (OUTPATIENT)
Dept: LAB | Facility: CLINIC | Age: 59
End: 2024-12-09
Payer: COMMERCIAL

## 2024-12-09 DIAGNOSIS — E78.2 MIXED HYPERLIPIDEMIA: ICD-10-CM

## 2024-12-09 PROCEDURE — 80061 LIPID PANEL: CPT | Mod: ORL | Performed by: FAMILY MEDICINE

## 2024-12-10 LAB
CHOLEST SERPL-MCNC: 205 MG/DL
FASTING STATUS PATIENT QL REPORTED: ABNORMAL
HDLC SERPL-MCNC: 50 MG/DL
LDLC SERPL CALC-MCNC: 130 MG/DL
NONHDLC SERPL-MCNC: 155 MG/DL
TRIGL SERPL-MCNC: 126 MG/DL

## 2025-04-07 ENCOUNTER — MEDICAL CORRESPONDENCE (OUTPATIENT)
Dept: HEALTH INFORMATION MANAGEMENT | Facility: CLINIC | Age: 60
End: 2025-04-07
Payer: COMMERCIAL

## 2025-04-08 ENCOUNTER — TRANSCRIBE ORDERS (OUTPATIENT)
Dept: OTHER | Age: 60
End: 2025-04-08

## 2025-04-08 DIAGNOSIS — E11.9: Primary | ICD-10-CM

## 2025-04-30 ENCOUNTER — ANCILLARY PROCEDURE (OUTPATIENT)
Dept: ULTRASOUND IMAGING | Facility: CLINIC | Age: 60
End: 2025-04-30
Attending: FAMILY MEDICINE
Payer: COMMERCIAL

## 2025-04-30 DIAGNOSIS — R19.06 EPIGASTRIC MASS: ICD-10-CM

## 2025-04-30 PROCEDURE — 76604 US EXAM CHEST: CPT | Mod: TC | Performed by: RADIOLOGY

## 2025-05-05 ENCOUNTER — MEDICAL CORRESPONDENCE (OUTPATIENT)
Dept: HEALTH INFORMATION MANAGEMENT | Facility: CLINIC | Age: 60
End: 2025-05-05
Payer: COMMERCIAL

## 2025-05-06 ENCOUNTER — TRANSCRIBE ORDERS (OUTPATIENT)
Dept: OTHER | Age: 60
End: 2025-05-06

## 2025-05-06 DIAGNOSIS — H93.13 TINNITUS OF BOTH EARS: Primary | ICD-10-CM

## 2025-05-07 ENCOUNTER — PATIENT OUTREACH (OUTPATIENT)
Dept: CARE COORDINATION | Facility: CLINIC | Age: 60
End: 2025-05-07
Payer: COMMERCIAL

## 2025-06-12 ENCOUNTER — OFFICE VISIT (OUTPATIENT)
Dept: AUDIOLOGY | Facility: CLINIC | Age: 60
End: 2025-06-12
Attending: FAMILY MEDICINE
Payer: COMMERCIAL

## 2025-06-12 DIAGNOSIS — H93.13 TINNITUS OF BOTH EARS: ICD-10-CM

## 2025-06-12 DIAGNOSIS — H90.3 ASYMMETRICAL SENSORINEURAL HEARING LOSS: Primary | ICD-10-CM

## 2025-06-12 NOTE — PROGRESS NOTES
AUDIOLOGY REPORT    SUBJECTIVE:  Tracie Sosa is a 59 year old female who was seen in the Audiology Clinic at the [facility] for audiologic evaluation, referred by Daniel Alberto M.D. The patient reports a new onset of bilateral, constant tinnitus that began around November of 2014 with no apparent cause. Tracie reports a gradual decline in hearing, left worse than right, that began after having children 30+ years ago. The patient denies dizziness, bilateral otalgia, bilateral drainage, bilateral aural fullness, history of ear surgeries, family history of hearing loss, and history of noise exposure.  The patient notes difficulty with communication in a variety of listening situations. They were accompanied today by their . Yes: Language: Candido, ID Number/Identifier: [id number].    OBJECTIVE:  Falls Risk Screening  Falls Risk Completed by: Audiology  Have you fallen 2 or more times in the past year? No  Have you fallen and had an injury in the past year? No  Is the patient receiving Physical Therapy services? No  Fall Screen Comments:             Otoscopic exam indicates ears are clear of cerumen bilaterally     Pure Tone Thresholds assessed using conventional audiometry with good  reliability from 250-8000 Hz bilaterally using insert earphones and circumaural headphones     RIGHT:  moderate sloping to severe sensorineural hearing loss    LEFT:    moderate-severe sloping to severe sensorineural hearing loss    Tympanogram:    RIGHT: normal eardrum mobility    LEFT:   normal eardrum mobility    Reflexes (reported by stimulus ear):  RIGHT: Ipsilateral is absent at frequencies tested  RIGHT: Contralateral is absent at frequencies tested  LEFT:   Ipsilateral is absent at frequencies tested  LEFT:   Contralateral is absent at frequencies tested      Speech DETECTION Threshold:    RIGHT: 40 dB HL    LEFT:   60 dB HL    *Speech Reception Threshold & Word Recognition Score not tested due to  language barrier     ASSESSMENT:   Asymmetrical sensorineural hearing loss with constant, bilateral tinnitus. Today s results were discussed with the patient in detail.     PLAN:  Patient was counseled regarding hearing loss and impact on communication.  Patient is a good candidate for amplification at this time. It is recommended that the patient follow-up with ENT regarding asymmetry between ears and new onset of bilateral tinnitus. Pending medical clearance, patient should return for a hearing aid consultation.  Please call this clinic with questions regarding these results or recommendations.      Shwetha Graham, [facility]  Licensed Audiologist  [place] #[id number]

## (undated) DEVICE — PAD CHUX UNDERPAD 23X24" 7136

## (undated) DEVICE — KIT ENDO FIRST STEP DISINFECTANT 200ML W/POUCH EP-4